# Patient Record
Sex: FEMALE | Employment: OTHER | URBAN - METROPOLITAN AREA
[De-identification: names, ages, dates, MRNs, and addresses within clinical notes are randomized per-mention and may not be internally consistent; named-entity substitution may affect disease eponyms.]

---

## 2025-02-05 ENCOUNTER — APPOINTMENT (OUTPATIENT)
Dept: DERMATOLOGY | Facility: CLINIC | Age: 80
End: 2025-02-05
Payer: MEDICARE

## 2025-02-05 DIAGNOSIS — D22.9 MELANOCYTIC NEVUS, UNSPECIFIED LOCATION: ICD-10-CM

## 2025-02-05 DIAGNOSIS — L21.9 SEBORRHEIC DERMATITIS: Primary | ICD-10-CM

## 2025-02-05 DIAGNOSIS — D18.01 HEMANGIOMA OF SKIN: ICD-10-CM

## 2025-02-05 DIAGNOSIS — L82.1 SEBORRHEIC KERATOSIS: ICD-10-CM

## 2025-02-05 DIAGNOSIS — Z85.828 PERSONAL HISTORY OF SKIN CANCER: ICD-10-CM

## 2025-02-05 DIAGNOSIS — L90.5 SCAR CONDITIONS AND FIBROSIS OF SKIN: ICD-10-CM

## 2025-02-05 DIAGNOSIS — L81.4 LENTIGO: ICD-10-CM

## 2025-02-05 PROCEDURE — 99204 OFFICE O/P NEW MOD 45 MIN: CPT | Performed by: DERMATOLOGY

## 2025-02-05 PROCEDURE — 1159F MED LIST DOCD IN RCRD: CPT | Performed by: DERMATOLOGY

## 2025-02-05 RX ORDER — CLOPIDOGREL BISULFATE 75 MG/1
75 TABLET ORAL
COMMUNITY
Start: 2024-09-04 | End: 2025-09-04

## 2025-02-05 RX ORDER — ATORVASTATIN CALCIUM 80 MG/1
1 TABLET, FILM COATED ORAL NIGHTLY
COMMUNITY
Start: 2024-09-04 | End: 2025-09-04

## 2025-02-05 RX ORDER — AMLODIPINE BESYLATE 10 MG/1
10 TABLET ORAL
COMMUNITY
Start: 2024-07-30 | End: 2025-07-30

## 2025-02-05 RX ORDER — CHOLECALCIFEROL (VITAMIN D3) 25 MCG
1 TABLET ORAL
COMMUNITY

## 2025-02-05 RX ORDER — ASPIRIN 81 MG/1
81 TABLET ORAL
COMMUNITY

## 2025-02-05 RX ORDER — METOPROLOL TARTRATE 25 MG/1
12.5 TABLET, FILM COATED ORAL 2 TIMES DAILY
COMMUNITY
Start: 2024-09-04 | End: 2025-09-04

## 2025-02-05 RX ORDER — CLOBETASOL PROPIONATE 0.5 MG/ML
SOLUTION TOPICAL
COMMUNITY
Start: 2024-07-31

## 2025-02-05 RX ORDER — FLUOROURACIL 50 MG/G
CREAM TOPICAL
COMMUNITY
Start: 2024-07-31

## 2025-02-05 RX ORDER — HYDROCORTISONE 25 MG/G
OINTMENT TOPICAL
COMMUNITY
Start: 2025-01-14

## 2025-02-05 RX ORDER — CLOBETASOL PROPIONATE 0.5 MG/ML
SOLUTION TOPICAL 2 TIMES DAILY
Qty: 50 ML | Refills: 11 | Status: SHIPPED | OUTPATIENT
Start: 2025-02-05 | End: 2025-02-19

## 2025-02-05 RX ORDER — DEXTROAMPHETAMINE SACCHARATE, AMPHETAMINE ASPARTATE MONOHYDRATE, DEXTROAMPHETAMINE SULFATE AND AMPHETAMINE SULFATE 1.25; 1.25; 1.25; 1.25 MG/1; MG/1; MG/1; MG/1
5 CAPSULE, EXTENDED RELEASE ORAL
COMMUNITY
Start: 2025-01-23 | End: 2025-04-23

## 2025-02-05 RX ORDER — KETOCONAZOLE 20 MG/ML
SHAMPOO, SUSPENSION TOPICAL
COMMUNITY
Start: 2019-12-12

## 2025-02-05 NOTE — PROGRESS NOTES
Subjective     Gisselle Ramírez is a 79 y.o. female who presents for the following: Skin Check (New pt. FBSE. Area of concern to mid partial scalp. 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     Skin Cancer Screening  She has a history of moderate sun exposure. She is in the sun daily. She uses sunscreen frequently. She reports no skin symptoms. Her moles are not changing.    Spots that concern her: mid partial scalp    Hx of SCCIS    Review of Systems:  No other skin or systemic complaints other than what is documented elsewhere in the note.    The following portions of the chart were reviewed this encounter and updated as appropriate:       Skin Cancer History  No skin cancer on file.    Specialty Problems    None    Past Medical History:  Gisselle Ramírez  has no past medical history on file.    Past Surgical History:  Gisselle Ramírez  has no past surgical history on file.    Family History:  Patient family history is not on file.       Objective   Well appearing patient in no apparent distress; mood and affect are within normal limits.    A full examination was performed including scalp, head, eyes, ears, nose, lips, neck, chest, axillae, abdomen, back, buttocks, bilateral upper extremities, bilateral lower extremities, hands, feet, fingers, toes, fingernails, and toenails. All findings within normal limits unless otherwise noted below.    Assessment/Plan   1. Seborrheic dermatitis  Scalp  Erythema with overlying greasy scale.    Encouraged patient to try to use ketoconazole shampoo twice weekly and leave in for 5-10 minutes before rinsing out. Also counseled patient to use the clobetasol solution to affected itchy areas twice daily x 2 weeks then as needed for flares. Risks, benefits, side effects, alternatives and options were discussed with patient and the patient voiced understanding. Pt agrees with plan as above.       Related Medications  clobetasol  (Temovate) 0.05 % external solution  Apply topically 2 times a day for 14 days. Then as needed for flares    2. Hemangioma of skin  Scattered cherry-red papule(s).    3. Melanocytic nevus, unspecified location  All nevi were symmetric brown macules without atypia on dermoscopy.     The ABCDEs of melanoma and warning signs of non-melanoma skin cancer were discussed with patient and patient expressed understanding. Sun protection and use of at least SPF 30 discussed with patient. Pt instructed to reapply every 2 hours.     4. Lentigo  Scattered tan macules in sun-exposed areas.    The ABCDEs of melanoma and warning signs of non-melanoma skin cancer were discussed with patient and patient expressed understanding. Sun protection and use of at least SPF 30 discussed with patient. Pt instructed to reapply every 2 hours.     5. Seborrheic keratosis  Stuck on verrucous, tan-brown papules and plaques.      6. Scar conditions and fibrosis of skin  Scar is well-healed without evidence of recurrence at site of previous SCCIS biopsy    Counseled patient given that spot appears resolved, we will continue to monitor for recurrence and only treat as needed since patient would like to avoid additional therapy if possible. Risks, benefits, side effects, alternatives and options were discussed with patient and the patient voiced understanding. Pt agrees with plan as above.       Follow up 6 months or sooner as needed.

## 2025-04-16 ENCOUNTER — APPOINTMENT (OUTPATIENT)
Dept: DERMATOLOGY | Facility: CLINIC | Age: 80
End: 2025-04-16
Payer: MEDICARE

## 2025-08-06 ENCOUNTER — APPOINTMENT (OUTPATIENT)
Dept: DERMATOLOGY | Facility: CLINIC | Age: 80
End: 2025-08-06
Payer: MEDICARE

## 2025-08-06 DIAGNOSIS — L81.4 LENTIGO: ICD-10-CM

## 2025-08-06 DIAGNOSIS — L21.9 SEBORRHEIC DERMATITIS: ICD-10-CM

## 2025-08-06 DIAGNOSIS — Z85.828 PERSONAL HISTORY OF SKIN CANCER: ICD-10-CM

## 2025-08-06 DIAGNOSIS — L82.1 SEBORRHEIC KERATOSIS: ICD-10-CM

## 2025-08-06 DIAGNOSIS — L90.5 SCAR CONDITIONS AND FIBROSIS OF SKIN: Primary | ICD-10-CM

## 2025-08-06 DIAGNOSIS — D18.01 HEMANGIOMA OF SKIN: ICD-10-CM

## 2025-08-06 DIAGNOSIS — L82.0 INFLAMED SEBORRHEIC KERATOSIS: ICD-10-CM

## 2025-08-06 DIAGNOSIS — L57.0 ACTINIC KERATOSIS: ICD-10-CM

## 2025-08-06 DIAGNOSIS — D22.9 MELANOCYTIC NEVUS, UNSPECIFIED LOCATION: ICD-10-CM

## 2025-08-06 PROCEDURE — 99213 OFFICE O/P EST LOW 20 MIN: CPT | Performed by: DERMATOLOGY

## 2025-08-06 PROCEDURE — 1159F MED LIST DOCD IN RCRD: CPT | Performed by: DERMATOLOGY

## 2025-08-06 PROCEDURE — 17000 DESTRUCT PREMALG LESION: CPT

## 2025-08-06 RX ORDER — KETOCONAZOLE 20 MG/ML
SHAMPOO, SUSPENSION TOPICAL EVERY OTHER DAY
Qty: 120 ML | Refills: 11 | Status: SHIPPED | OUTPATIENT
Start: 2025-08-06

## 2025-08-06 NOTE — Clinical Note
Destruction of Actinic Keratosis Procedure Note  Diagnosis: Thin erythematous papules with gritty scale consistent with AK  Location: see physical exam  Informed consent: Discussed risks (permanent scarring, light or dark discoloration, infection, pain, bleeding, bruising, redness, blister formation, and recurrence of the lesion) and benefits of the procedure, as well as the alternatives.  Informed consent was obtained.  Anesthesia: not required  Procedure Details:  The lesion was destroyed with liquid nitrogen. The patient tolerated procedure well.  Total number of lesions treated:  1  Plan:  The patient was instructed on post-op care. Recommend OTC analgesia as needed for pain.

## 2025-08-06 NOTE — Clinical Note
Counseled patient given that spot appears resolved, we will continue to monitor for recurrence and only treat as needed since patient would like to avoid additional therapy if possible. Risks, benefits, side effects, alternatives and options were discussed with patient and the patient voiced understanding. Pt agrees with plan as above.     Patient advised on sun protection habits including use of hats, sun protection and use of at least SPF 30. Given location on scalp, advised usage of hats and sun avoidance.

## 2025-08-06 NOTE — Clinical Note
Has been using ketoconazole shampoo twice weekly and leaving in for 5-10 minutes before rinsing out. Encouraged to use EOD or daily if needed.   Also counseled patient to use the clobetasol solution to affected itchy areas twice daily x 2 weeks then as needed for flares. Risks, benefits, side effects, alternatives and options were discussed with patient and the patient voiced understanding. Pt agrees with plan as above.

## 2025-08-06 NOTE — PROGRESS NOTES
Subjective     Gisselle Ramírez is a 79 y.o. female who presents for the following: Skin Check (6 month FBSE. Hx of SCC, SCCIS, and AK's. Pt reports no areas of concern at this time. ).     Skin Cancer Screening  She has a history of heavy sun exposure. She is in the sun daily. She uses sunscreen daily. She reports no skin symptoms. Her moles are not changing.    Spots that concern her: Mid right back there is an itchy pink spot.    SCCIS 7/31/24, 5/25/22  SCC 2/15/21  AK's    Review of Systems:  No other skin or systemic complaints other than what is documented elsewhere in the note.    The following portions of the chart were reviewed this encounter and updated as appropriate:       Skin Cancer History  Biopsy Log Book  No skin cancers from Specimen Tracking.    Additional History      Specialty Problems    None    Past Medical History:  Gisselle Ramírez  has no past medical history on file.    Past Surgical History:  Gisselle Ramírez  has no past surgical history on file.    Family History:  Patient family history is not on file.       Objective   Well appearing patient in no apparent distress; mood and affect are within normal limits.    A full examination was performed including scalp, head, eyes, ears, nose, lips, neck, chest, axillae, abdomen, back, buttocks, bilateral upper extremities, bilateral lower extremities, hands, feet, fingers, toes, fingernails, and toenails. All findings within normal limits unless otherwise noted below.    Assessment/Plan   Skin Exam  1. HEMANGIOMA OF SKIN  Generalized  Scattered cherry-red papule(s).  2. MELANOCYTIC NEVUS, UNSPECIFIED LOCATION  Generalized  All nevi were symmetric brown macules without atypia on dermoscopy.   The ABCDEs of melanoma and warning signs of non-melanoma skin cancer were discussed with patient and patient expressed understanding. Sun protection and use of at least SPF 30 discussed with patient. Pt instructed to reapply every 2 hours.   This Visit  -  "Follow Up In Dermatology - Established Patient  3. LENTIGO  Generalized  Scattered tan macules in sun-exposed areas.  The ABCDEs of melanoma and warning signs of non-melanoma skin cancer were discussed with patient and patient expressed understanding. Sun protection and use of at least SPF 30 discussed with patient. Pt instructed to reapply every 2 hours.   4. SEBORRHEIC KERATOSIS  Generalized  Stuck on verrucous, tan-brown papules and plaques.    5. SCAR CONDITIONS AND FIBROSIS OF SKIN  Generalized  Scar is well-healed without evidence of recurrence at site of previous SCCIS biopsy  Counseled patient given that spot appears resolved, we will continue to monitor for recurrence and only treat as needed since patient would like to avoid additional therapy if possible. Risks, benefits, side effects, alternatives and options were discussed with patient and the patient voiced understanding. Pt agrees with plan as above.     Patient advised on sun protection habits including use of hats, sun protection and use of at least SPF 30. Given location on scalp, advised usage of hats and sun avoidance.    This Visit  - Follow Up In Dermatology - Established Patient  6. INFLAMED SEBORRHEIC KERATOSIS  Right Lower Back  Inflamed seborrheic keratosis: pink  \"stuck on\" verrucous scaly papule with surrounding erythema and overlying keratotic scale  Benign  The nature of the diagnosis was explained to patient.   Re-check lesion in 6 months  This Visit  - Follow Up In Dermatology - Established Patient  7. SEBORRHEIC DERMATITIS  Scalp  Erythema with overlying greasy scale.  Has been using ketoconazole shampoo twice weekly and leaving in for 5-10 minutes before rinsing out. Encouraged to use EOD or daily if needed.   Also counseled patient to use the clobetasol solution to affected itchy areas twice daily x 2 weeks then as needed for flares. Risks, benefits, side effects, alternatives and options were discussed with patient and the patient " voiced understanding. Pt agrees with plan as above.     - ketoconazole (NIZOral) 2 % shampoo - Scalp - Apply topically every other day.  8. ACTINIC KERATOSIS  Left Tip of Nose  Destruction of Actinic Keratosis Procedure Note  Diagnosis: Thin erythematous papules with gritty scale consistent with AK  Location: see physical exam  Informed consent: Discussed risks (permanent scarring, light or dark discoloration, infection, pain, bleeding, bruising, redness, blister formation, and recurrence of the lesion) and benefits of the procedure, as well as the alternatives.  Informed consent was obtained.  Anesthesia: not required  Procedure Details:  The lesion was destroyed with liquid nitrogen. The patient tolerated procedure well.  Total number of lesions treated:  1  Plan:  The patient was instructed on post-op care. Recommend OTC analgesia as needed for pain.    - Destr of lesion - Left Tip of Nose  Complexity: simple    Destruction method: cryotherapy    Informed consent: discussed and consent obtained    Lesion destroyed using liquid nitrogen: Yes    Region frozen until ice ball extended beyond lesion: Yes    Cryotherapy cycles:  2  Outcome: patient tolerated procedure well with no complications    Post-procedure details: wound care instructions given      This Visit  - Follow Up In Dermatology - Established Patient   RTC 6 months for FBSE    Patient seen and discussed with Dr. Linda Hernandez MD   PGY2 Dermatology Resident  8/6/2025     I saw and evaluated the patient. I personally obtained the key and critical portions of the history and physical exam or was physically present for key and critical portions performed by the student/resident. I reviewed the student/resident's documentation and discussed the patient with the student/resident. I was present for the entirety of any procedure(s). I agree with the student/resident's medical decision making as documented in the note.

## 2025-08-06 NOTE — Clinical Note
"Inflamed seborrheic keratosis: pink  \"stuck on\" verrucous scaly papule with surrounding erythema and overlying keratotic scale  "

## 2026-02-09 ENCOUNTER — APPOINTMENT (OUTPATIENT)
Dept: DERMATOLOGY | Facility: CLINIC | Age: 81
End: 2026-02-09
Payer: MEDICARE